# Patient Record
Sex: MALE | Race: WHITE
[De-identification: names, ages, dates, MRNs, and addresses within clinical notes are randomized per-mention and may not be internally consistent; named-entity substitution may affect disease eponyms.]

---

## 2020-07-29 ENCOUNTER — HOSPITAL ENCOUNTER (EMERGENCY)
Dept: HOSPITAL 56 - MW.ED | Age: 25
Discharge: HOME | End: 2020-07-29
Payer: SELF-PAY
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
## 2020-07-29 DIAGNOSIS — S61.210A: Primary | ICD-10-CM

## 2020-07-29 PROCEDURE — 12001 RPR S/N/AX/GEN/TRNK 2.5CM/<: CPT

## 2020-07-29 PROCEDURE — 99282 EMERGENCY DEPT VISIT SF MDM: CPT

## 2020-07-29 NOTE — EDM.PDOC
ED HPI GENERAL MEDICAL PROBLEM





- General


Chief Complaint: Laceration


Stated Complaint: FINGER LACERATION


Time Seen by Provider: 07/29/20 13:18


Source of Information: Reports: Patient


History Limitations: Reports: No Limitations





- History of Present Illness


INITIAL COMMENTS - FREE TEXT/NARRATIVE: 


25-year-old male with no past medical history presenting with a laceration of 

the right index finger.  About 1 hour prior to arrival, the patient injured his 

right index finger with a tape measure.  There was no rust on the tool.  Tetanus

immunization is up-to-date.  No other complaints.





- Related Data


                                    Allergies











Allergy/AdvReac Type Severity Reaction Status Date / Time


 


No Known Allergies Allergy   Verified 07/29/20 13:44











Home Meds: 


                                    Home Meds





. [No Known Home Meds]  07/29/20 [History]











Past Medical History


Other Dermatologic History: Previous laceration to R ring finger- splint





Social & Family History





- Family History


Family Medical History: Noncontributory





- Tobacco Use


Smoking Status *Q: Never Smoker





- Caffeine Use


Caffeine Use: Reports: Energy Drinks





- Recreational Drug Use


Recreational Drug Use: No





ED ROS GENERAL





- Review of Systems


Review Of Systems: See Below


Skin: Reports: Wound





ED EXAM, SKIN/RASH


Exam: See Below


Text/Narrative:: 


Vital signs reviewed.  Nursing notes reviewed.


Constitutional: Awake, alert, non-distressed.


Head: Normocephalic, atraumatic.


Eyes: EOMI, conjunctiva normal, no discharge, no scleral icterus.


Ears, Nose, Throat: External ears and nose normal, moist oral mucosa.


Cardiovascular: 2+ radial pulse, capillary refill less than 2 seconds.


Pulmonary: normal work of breathing, no accessory muscle use.


Abdomen/GI: Soft, nontender, nondistended, no guarding or rigidity, no masses.


Musculoskeletal: No deformities.


Integumentary: Appropriate color for ethnicity, warm, dry, no pallor or 

jaundice, no rash.  1.8 cm linear laceration to the volar surface of the middle 

phalanx of the right index finger.


Neurologic: Alert, answering questions appropriately, normal speech, no facial 

droop, moving all extremities well.


Psychiatric: Appropriate mood and affect, normal thought process.


Exam Limited By: No Limitations


General Appearance: Alert, WD/WN, No Apparent Distress


Ears: Normal External Exam, Normal Canal, Hearing Grossly Normal, Normal TMs


Nose: Normal Inspection, Normal Mucosa, No Blood


Throat/Mouth: Normal Inspection, Normal Lips, Normal Teeth, Normal Gums, Normal 

Oropharynx, Normal Voice, No Airway Compromise


Head: Atraumatic, Normocephalic


Neck: Normal Inspection, Supple, Non-Tender, Full Range of Motion


Respiratory/Chest: No Respiratory Distress, Lungs Clear, Normal Breath Sounds, 

No Accessory Muscle Use, Chest Non-Tender


Cardiovascular: Normal Peripheral Pulses, Regular Rate, Rhythm, No Edema, No 

Gallop, No JVD, No Murmur, No Rub


GI/Abdominal: Normal Bowel Sounds, Soft, Non-Tender, No Organomegaly, No 

Distention, No Abnormal Bruit, No Mass


 (Male) Exam: No Hernia, Normal Inspection, Normal Prostate, Circumcised


Rectal (Males) Exam: Normal Exam, Normal Rectal Tone, Prostate Normal


Back Exam: Normal Inspection, Full Range of Motion, NT


Extremities: Normal Inspection, Normal Range of Motion, Non-Tender, No Pedal 

Edema, Normal Capillary Refill


Neurological: Alert, Oriented, CN II-XII Intact, Normal Cognition, Normal Gait, 

Normal Reflexes, No Motor/Sensory Deficits


Psychiatric: Normal Affect, Normal Mood


Lymphatic: No Adenopathy





ED SKIN PROCEDURES





- Laceration/Wound Repair


  ** Right Digit - 2nd (Index)


Appearance: Superficial


Distal NVT: Neuro & Vascular Intact


Anesthetic Type: Local


Local Anesthesia - Bupivicaine (Marcaine): 0.5% Plain


Local Anesthetic Volume: 2cc


Skin Prep: Saline


Exploration/Debridement/Repair: Wound Explored, In a Bloodless Field, No Foreign

 Material Found


Closed with: Sutures


Lac/Wound length In cm: 1.8


Suture Size: 4-0


# of Sutures: 4


Suture Type: Nylon


Tetanus Status Addressed: Yes (Up to date)


Complications: No





Course





- Vital Signs


Text/Narrative:: 


25-year-old male with a finger laceration.  Tetanus up-to-date.  Underwent 

digital block, copiously irrigated.  Wound explored, no foreign bodies.  

Repaired with sutures, see the procedure documentation.  Bandaged.  Stable to 

discharge home.  Suture removal in 7 days.  Over-the-counter Tylenol and Motrin 

as needed for pain.  ED return precautions provided.


Last Recorded V/S: 


                                Last Vital Signs











Temp  36.0 C L  07/29/20 13:45


 


Pulse  60   07/29/20 13:45


 


Resp  20   07/29/20 13:45


 


BP  120/78   07/29/20 13:45


 


Pulse Ox  99   07/29/20 13:45














- Orders/Labs/Meds


Orders: 


                               Active Orders 24 hr











 Category Date Time Status


 


 Procedure Tray at Bedside [RC] ASDIRECTED Care  07/29/20 14:04 Active


 


 ED Laceration Reflex [OM.PC] Click to Edit Oth  07/29/20 14:04 Ordered











Meds: 


Medications














Discontinued Medications














Generic Name Dose Route Start Last Admin





  Trade Name Anastacio  PRN Reason Stop Dose Admin


 


Acetaminophen  1,000 mg  07/29/20 14:04  07/29/20 14:28





  Tylenol Extra Strength  PO  07/29/20 14:05  1,000 mg





  ONETIME ONE   Administration


 


Bupivacaine HCl  10 ml  07/29/20 14:04  07/29/20 14:28





  Sensorcaine-Mpf 0.5%  INJECT  07/29/20 14:05  10 ml





  ONETIME ONE   Administration


 


Ibuprofen  400 mg  07/29/20 14:04  07/29/20 14:28





  Motrin  PO  07/29/20 14:05  400 mg





  ONETIME ONE   Administration














Departure





- Departure


Time of Disposition: 15:33


Disposition: Home, Self-Care 01


Condition: Good


Clinical Impression: 


Laceration of right index finger w/o foreign body w/o damage to nail


Qualifiers:


 Encounter type: initial encounter Qualified Code(s): S61.210A - Laceration 

without foreign body of right index finger without damage to nail, initial 

encounter








- Discharge Information


Instructions:  Laceration Care, Adult, Sutures, Staples, or Adhesive Wound 

Closure


Forms:  ED Department Discharge


Additional Instructions: 


Your sutures need to be removed in 7 days.


Take over-the-counter Tylenol or Motrin as needed for pain.





The following information is given to patients seen in the emergency department 

who are being discharged. This information is to outline your options for 

follow-up care. We provide all patients seen in our emergency department with a 

follow-up referral.





The need for follow-up, as well as the timing and circumstances, are variable 

depending upon the specifics of your emergency department visit.





If you don't have a primary care physician on staff, we will provide you with a 

referral. We always advise you to contact your personal physician following an 

emergency department visit to inform them of the circumstance of the visit and 

for follow-up with them and/or the need for any referrals to a consulting 

specialist.





The emergency department will also refer you to a specialist when appropriate. 

This referral assures that you have the opportunity for follow-up care with a 

specialist. All of these measure are taken in an effort to provide you with 

optimal care, which includes your follow-up.





Under all circumstances we always encourage you to contact your private 

physician who remains a resource for coordinating your care. When calling for 

follow-up care, please make the office aware that this follow-up is from your 

recent emergency room visit. If for any reason you are refused follow-up, please

 contact the [facility] Emergency 

Department at [phone] and asked to speak to the emergency department 

charge nurse.





If you do not have a primary care physician that is caring for you, you can 

contact these clinics below to set up an appointment to establish care:





[facility]


[address] [place] [id number]


Phone: [phone]


Fax: [phone]





[place] [address]


Phone: [phone]


Fax: [phone]





Sepsis Event Note (ED)





- Evaluation


Sepsis Screening Result: No Definite Risk





- Focused Exam


Vital Signs: 


                                   Vital Signs











  Temp Pulse Resp BP Pulse Ox


 


 07/29/20 13:45  36.0 C L  60  20  120/78  99














- My Orders


Last 24 Hours: 


My Active Orders





07/29/20 14:04


Procedure Tray at Bedside [RC] ASDIRECTED 


ED Laceration Reflex [OM.PC] Click to Edit 














- Assessment/Plan


Last 24 Hours: 


My Active Orders





07/29/20 14:04


Procedure Tray at Bedside [RC] ASDIRECTED 


ED Laceration Reflex [OM.PC] Click to Edit